# Patient Record
Sex: FEMALE
[De-identification: names, ages, dates, MRNs, and addresses within clinical notes are randomized per-mention and may not be internally consistent; named-entity substitution may affect disease eponyms.]

---

## 2017-05-08 ENCOUNTER — HOSPITAL ENCOUNTER (EMERGENCY)
Dept: HOSPITAL 14 - H.ER | Age: 27
Discharge: HOME | End: 2017-05-08
Payer: COMMERCIAL

## 2017-05-08 VITALS — SYSTOLIC BLOOD PRESSURE: 110 MMHG | OXYGEN SATURATION: 99 % | DIASTOLIC BLOOD PRESSURE: 68 MMHG

## 2017-05-08 VITALS — BODY MASS INDEX: 42 KG/M2

## 2017-05-08 VITALS — TEMPERATURE: 98.4 F | RESPIRATION RATE: 18 BRPM | HEART RATE: 88 BPM

## 2017-05-08 DIAGNOSIS — E11.9: ICD-10-CM

## 2017-05-08 DIAGNOSIS — I88.0: ICD-10-CM

## 2017-05-08 DIAGNOSIS — R19.7: ICD-10-CM

## 2017-05-08 DIAGNOSIS — Z79.84: ICD-10-CM

## 2017-05-08 DIAGNOSIS — R10.31: Primary | ICD-10-CM

## 2017-05-08 LAB
ALBUMIN/GLOB SERPL: 1.3 {RATIO} (ref 1–2.1)
ALP SERPL-CCNC: 66 U/L (ref 38–126)
ALT SERPL-CCNC: 30 U/L (ref 9–52)
AST SERPL-CCNC: 36 U/L (ref 14–36)
BASOPHILS # BLD AUTO: 0 K/UL (ref 0–0.2)
BASOPHILS NFR BLD: 0.4 % (ref 0–2)
BILIRUB SERPL-MCNC: 0.5 MG/DL (ref 0.2–1.3)
BILIRUB UR-MCNC: NEGATIVE MG/DL
BUN SERPL-MCNC: 14 MG/DL (ref 7–17)
CALCIUM SERPL-MCNC: 8.9 MG/DL (ref 8.4–10.2)
CHLORIDE SERPL-SCNC: 109 MMOL/L (ref 98–107)
CO2 SERPL-SCNC: 22 MMOL/L (ref 22–30)
COLOR UR: YELLOW
EOSINOPHIL # BLD AUTO: 0.1 K/UL (ref 0–0.7)
EOSINOPHIL NFR BLD: 1.5 % (ref 0–4)
ERYTHROCYTE [DISTWIDTH] IN BLOOD BY AUTOMATED COUNT: 16.1 % (ref 11.5–14.5)
GLOBULIN SER-MCNC: 3.2 GM/DL (ref 2.2–3.9)
GLUCOSE SERPL-MCNC: 86 MG/DL (ref 65–105)
GLUCOSE UR STRIP-MCNC: (no result) MG/DL
HCT VFR BLD CALC: 29.9 % (ref 34–47)
KETONES UR STRIP-MCNC: NEGATIVE MG/DL
LEUKOCYTE ESTERASE UR-ACNC: (no result) LEU/UL
LIPASE SERPL-CCNC: 88 U/L (ref 23–300)
LYMPHOCYTES # BLD AUTO: 2.3 K/UL (ref 1–4.3)
LYMPHOCYTES NFR BLD AUTO: 34.5 % (ref 20–40)
MCH RBC QN AUTO: 23.8 PG (ref 27–31)
MCHC RBC AUTO-ENTMCNC: 32.1 G/DL (ref 33–37)
MCV RBC AUTO: 74.3 FL (ref 81–99)
MONOCYTES # BLD: 0.4 K/UL (ref 0–0.8)
MONOCYTES NFR BLD: 5.9 % (ref 0–10)
NEUTROPHILS # BLD: 3.9 K/UL (ref 1.8–7)
NEUTROPHILS NFR BLD AUTO: 57.7 % (ref 50–75)
NRBC BLD AUTO-RTO: 0.2 % (ref 0–0)
PH UR STRIP: 6 [PH] (ref 5–8)
PLATELET # BLD: 312 K/UL (ref 130–400)
PMV BLD AUTO: 8.6 FL (ref 7.2–11.7)
POTASSIUM SERPL-SCNC: 4.2 MMOL/L (ref 3.6–5)
PROT SERPL-MCNC: 7.4 G/DL (ref 6.3–8.2)
PROT UR STRIP-MCNC: NEGATIVE MG/DL
RBC # UR STRIP: NEGATIVE /UL
RBC #/AREA URNS HPF: 2 /HPF (ref 0–3)
SODIUM SERPL-SCNC: 142 MMOL/L (ref 132–148)
SP GR UR STRIP: 1.03 (ref 1–1.03)
UROBILINOGEN UR-MCNC: (no result) MG/DL (ref 0.2–1)
WBC # BLD AUTO: 6.8 K/UL (ref 4.8–10.8)
WBC #/AREA URNS HPF: 1 /HPF (ref 0–5)

## 2017-05-08 NOTE — CT
PROCEDURE:  CT Abdomen and pelvis dated 05/08/2017 



HISTORY:

rloand-umbilical/RLQ pain



COMPARISON:

None.



TECHNIQUE:

Contrast dose: 90 cc Omnipaque 300 contrast material



Radiation dose:



Total exam DLP = 847.64 mGy-cm.



This CT exam was performed using one or more of the following dose 

reduction techniques: Automated exposure control, adjustment of the 

mA and/or kV according to patient size, and/or use of iterative 

reconstruction technique.



FINDINGS:



LOWER THORAX:

Some minimal linear atelectasis seen both posterior sulci.  No 

evidence of consolidation effusion or basilar pneumothorax. 



LIVER:

Liver exhibits normal size measuring nearly 16 cm in CC dimension. 

Mild diffuse fatty hepatic infiltration.  No obvious hepatic mass 

collection or calcification. 



GALLBLADDER AND BILE DUCTS:

Gallbladder is physiologically distended. No evidence of intraluminal 

gallbladder calculi. 



PANCREAS:

Unremarkable. No gross lesion or ductal dilatation.



SPLEEN:

Spleen exhibits normal size and attenuation pattern without mass 

collection or calcification. 



ADRENALS:

No adrenal lesions. 



KIDNEYS AND URETERS:

Kidneys exhibit symmetric nephrograms.  No evidence of 

nephrolithiasis or hydronephrosis. 



VASCULATURE:

No evidence of abdominal aortic aneurysm. 



BOWEL:

Evaluation of the bowel is limited due to incomplete opacification. 

Metallic clips on and suture along the stomach. Clinical correlation 

surgical history. Visualized loops of small bowel exhibit normal 

contour and caliber. No evidence of acute mechanical small bowel 

obstruction. Moderate amount stool seen throughout the cecum and at 

ascending as well as proximal transverse colon. The remaining colon 

is relatively collapsed. 



APPENDIX:

Normal-appearing CT appendix best seen on coronal image number [id number] 

[id number]. No evidence to suggest acute appendicitis There are multiple 

small to medium-sized mesenteric lymph nodes consistent with 

mesenteric adenitis 



PERITONEUM:

Unremarkable. No free fluid. No free air. 



LYMPH NODES:

Unremarkable. No enlarged lymph nodes. 



BLADDER:

Urinary bladder is physiologically distended.  No evidence of 

intraluminal urinary bladder calculi. 



REPRODUCTIVE:

Large left adnexal cyst measuring approximately 4.1 x 3.5 cm. Pelvic 

ultrasound could be performed confirm 



BONES:

Osseous structures intact. 



OTHER FINDINGS:

None.



IMPRESSION:

Findings consistent with mesenteric adenitis.  Postoperative changes 

of gastric bypass. No evidence of acute appendicitis. 



Large left adnexal cyst.



Mild fatty hepatic infiltration.

## 2017-05-08 NOTE — ED PDOC
HPI: General Adult


Time Seen by Provider: 17 13:49


Chief Complaint (Nursing): Abdominal Pain


Chief Complaint (Provider): abdominal pain


History Per: Patient


History/Exam Limitations: no limitations


Additional Complaint(s): 


25yo female complaining of mid abdominal pain radiating down. It has been 

intermittent for a week but now worsening. For past 3 days there is diarrhea 

associated with eating. No nausea or vomit. No fever, hematochezia.





Patient notes menstrual period is late. 





Past Medical History


Reviewed: Historical Data, Nursing Documentation, Vital Signs


Vital Signs: 


 Last Vital Signs











Temp  98.4 F   17 13:27


 


Pulse  88   17 13:27


 


Resp  18   17 13:27


 


BP  122/72   17 13:27


 


Pulse Ox  100   17 14:11














- Medical History


PMH: Diabetes, Hypercholesterolemia, Sleep Apnea (Has C PAP at home for sleep 

but does not use it)





- Surgical History


Surgical History: Endoscopy


Other surgeries: gastric bypass





- Family History


Family History: States: Unknown Family Hx





- Immunization History


Hx Influenza Vaccination: Yes





- Home Medications


Home Medications: 


 Ambulatory Orders











 Medication  Instructions  Recorded


 


Metformin Hydrochloride [Metformin 500 mg PO BID 14





HCl]  


 


Famotidine [Pepcid] 20 mg PO BID #30 tab 02/22/15














- Allergies


Allergies/Adverse Reactions: 


 Allergies











Allergy/AdvReac Type Severity Reaction Status Date / Time


 


No Known Allergies Allergy   Verified 02/22/15 17:34














Review of Systems


ROS Statement: Except As Marked, All Systems Reviewed And Found Negative


Constitutional: Negative for: Fever


Gastrointestinal: Positive for: Abdominal Pain, Diarrhea.  Negative for: Nausea

, Vomiting





Physical Exam





- Reviewed


Nursing Documentation Reviewed: Yes


Vital Signs Reviewed: Yes





- Physical Exam


Appears: Positive for: Well, Non-toxic, No Acute Distress


Head Exam: Positive for: ATRAUMATIC, NORMAL INSPECTION, NORMOCEPHALIC


Skin: Positive for: Warm, Dry


Eye Exam: Positive for: EOMI, PERRL


Cardiovascular/Chest: Positive for: Regular Rate, Rhythm


Respiratory: Positive for: Normal Breath Sounds.  Negative for: Rales, Rhonchi, 

Wheezing


Gastrointestinal/Abdominal: Positive for: Soft, Tenderness (periumbilical, 

right lower quadrant, left lower quadrant).  Negative for: Other (Psoa's)





- ECG


O2 Sat by Pulse Oximetry: 100 (RA)


Pulse Ox Interpretation: Normal





Medical Decision Making


Medical Decision Makin negative for pregnancy





Additional Comments





- Additional Comments


Additional Comments: 





Scribe Attestation:


Documented by Vickey Caicedo acting as a scribe for Olivia Steele PA-C.


Provider Scribe Attestation:


All medical record entries made by the Scribe were at my direction and 

personally dictated by me. I


have reviewed the chart and agree that the record accurately reflects my 

personal performance of the


history, physical exam, medical decision making, and the department course for 

this patient. I have


also personally directed, reviewed, and agree with the discharge instructions 

and disposition.

## 2019-04-03 ENCOUNTER — HOSPITAL ENCOUNTER (EMERGENCY)
Dept: HOSPITAL 31 - C.ER | Age: 29
LOS: 1 days | Discharge: HOME | End: 2019-04-04
Payer: COMMERCIAL

## 2019-04-03 VITALS — BODY MASS INDEX: 42 KG/M2

## 2019-04-03 DIAGNOSIS — E78.00: ICD-10-CM

## 2019-04-03 DIAGNOSIS — E11.9: ICD-10-CM

## 2019-04-03 DIAGNOSIS — Z3A.00: ICD-10-CM

## 2019-04-03 DIAGNOSIS — G47.30: ICD-10-CM

## 2019-04-03 DIAGNOSIS — J45.909: ICD-10-CM

## 2019-04-03 DIAGNOSIS — O26.891: Primary | ICD-10-CM

## 2019-04-03 LAB
ALBUMIN SERPL-MCNC: 4.1 G/DL (ref 3.5–5)
ALBUMIN/GLOB SERPL: 1.4 {RATIO} (ref 1–2.1)
ALT SERPL-CCNC: 15 U/L (ref 9–52)
APTT BLD: 26 SECONDS (ref 21–34)
ARTERIAL BLOOD GAS O2 SAT: 97.4 % (ref 95–98)
ARTERIAL BLOOD GAS PCO2: 30 MM/HG (ref 35–45)
ARTERIAL BLOOD GAS TCO2: 21.3 MMOL/L (ref 22–28)
ARTERIAL PATENCY WRIST A: (no result)
AST SERPL-CCNC: 31 U/L (ref 14–36)
BACTERIA #/AREA URNS HPF: (no result) /[HPF]
BASOPHILS # BLD AUTO: 0 K/UL (ref 0–0.2)
BASOPHILS NFR BLD: 0.4 % (ref 0–2)
BILIRUB UR-MCNC: NEGATIVE MG/DL
BNP SERPL-MCNC: 76.6 PG/ML (ref 0–450)
BUN SERPL-MCNC: 8 MG/DL (ref 7–17)
CALCIUM SERPL-MCNC: 8.6 MG/DL (ref 8.6–10.4)
EOSINOPHIL # BLD AUTO: 0 K/UL (ref 0–0.7)
EOSINOPHIL NFR BLD: 0.5 % (ref 0–4)
ERYTHROCYTE [DISTWIDTH] IN BLOOD BY AUTOMATED COUNT: 19 % (ref 11.5–14.5)
GFR NON-AFRICAN AMERICAN: > 60
GLUCOSE UR STRIP-MCNC: NORMAL MG/DL
HCG,QUALITATIVE URINE: POSITIVE
HCO3 BLDA-SCNC: 22.8 MMOL/L (ref 21–28)
HGB BLD-MCNC: 8.8 G/DL (ref 11–16)
INHALED O2 CONCENTRATION: 21 %
INR PPP: 1
LEUKOCYTE ESTERASE UR-ACNC: (no result) LEU/UL
LYMPHOCYTES # BLD AUTO: 2.2 K/UL (ref 1–4.3)
LYMPHOCYTES NFR BLD AUTO: 30.1 % (ref 20–40)
MCH RBC QN AUTO: 21 PG (ref 27–31)
MCHC RBC AUTO-ENTMCNC: 30.2 G/DL (ref 33–37)
MCV RBC AUTO: 69.6 FL (ref 81–99)
MONOCYTES # BLD: 0.6 K/UL (ref 0–0.8)
MONOCYTES NFR BLD: 7.8 % (ref 0–10)
NEUTROPHILS # BLD: 4.5 K/UL (ref 1.8–7)
NEUTROPHILS NFR BLD AUTO: 61.2 % (ref 50–75)
NRBC BLD AUTO-RTO: 0 % (ref 0–2)
PH BLDA: 7.44 [PH] (ref 7.35–7.45)
PH UR STRIP: 5 [PH] (ref 5–8)
PLATELET # BLD: 368 K/UL (ref 130–400)
PMV BLD AUTO: 8.4 FL (ref 7.2–11.7)
PO2 BLDA: 92 MM/HG (ref 80–100)
PROT UR STRIP-MCNC: NEGATIVE MG/DL
PROTHROMBIN TIME: 11.1 SECONDS (ref 9.7–12.2)
RBC # BLD AUTO: 4.2 MIL/UL (ref 3.8–5.2)
RBC # UR STRIP: (no result) /UL
SP GR UR STRIP: 1.01 (ref 1–1.03)
SQUAMOUS EPITHIAL: 4 /HPF (ref 0–5)
UROBILINOGEN UR-MCNC: NORMAL MG/DL (ref 0.2–1)
WBC # BLD AUTO: 7.3 K/UL (ref 4.8–10.8)

## 2019-04-03 PROCEDURE — 99284 EMERGENCY DEPT VISIT MOD MDM: CPT

## 2019-04-03 PROCEDURE — 82803 BLOOD GASES ANY COMBINATION: CPT

## 2019-04-03 PROCEDURE — 85610 PROTHROMBIN TIME: CPT

## 2019-04-03 PROCEDURE — 85730 THROMBOPLASTIN TIME PARTIAL: CPT

## 2019-04-03 PROCEDURE — 85025 COMPLETE CBC W/AUTO DIFF WBC: CPT

## 2019-04-03 PROCEDURE — 84702 CHORIONIC GONADOTROPIN TEST: CPT

## 2019-04-03 PROCEDURE — 81001 URINALYSIS AUTO W/SCOPE: CPT

## 2019-04-03 PROCEDURE — 93005 ELECTROCARDIOGRAM TRACING: CPT

## 2019-04-03 PROCEDURE — 85378 FIBRIN DEGRADE SEMIQUANT: CPT

## 2019-04-03 PROCEDURE — 80053 COMPREHEN METABOLIC PANEL: CPT

## 2019-04-03 PROCEDURE — 84703 CHORIONIC GONADOTROPIN ASSAY: CPT

## 2019-04-03 PROCEDURE — 76856 US EXAM PELVIC COMPLETE: CPT

## 2019-04-03 PROCEDURE — 83880 ASSAY OF NATRIURETIC PEPTIDE: CPT

## 2019-04-03 PROCEDURE — 84484 ASSAY OF TROPONIN QUANT: CPT

## 2019-04-03 PROCEDURE — 76830 TRANSVAGINAL US NON-OB: CPT

## 2019-04-03 RX ADMIN — IPRATROPIUM BROMIDE AND ALBUTEROL SULFATE SCH ML: .5; 3 SOLUTION RESPIRATORY (INHALATION) at 20:57

## 2019-04-03 NOTE — C.PDOC
History Of Present Illness


The patient presents to the ED for evaluation of shortness of breath and chest 

discomfort that have been intermittent for around one month. Patient was given 

an Albuterol inhaler, which she has used twice without relief. Patient is s

peaking in full sentences in the ED at this time and denies fever, chills, 

nausea, vomiting, recent prolonged trips, or known trauma. 


Time Seen by Provider: 04/03/19 19:59


Chief Complaint (Nursing): Chest Pain


History Per: Patient


History/Exam Limitations: no limitations


Onset/Duration Of Symptoms: Intermittent Episodes, Other (one month )


Current Symptoms Are (Timing): Still Present


Severity: Mild


Pain Scale Rating Of: 2


Quality: "Pain"


Modifying Factors: None


Exacerbating Factors: None


Alleviating Factors: None


Recent travel outside of the [place]: No


Additional History Per: Patient





Past Medical History


Reviewed: Historical Data, Nursing Documentation, Vital Signs


Vital Signs: 





                                Last Vital Signs











Temp  97.9 F   04/03/19 19:42


 


Pulse  99 H  04/03/19 19:42


 


Resp  22   04/03/19 19:42


 


BP  130/87   04/03/19 19:42


 


Pulse Ox  100   04/03/19 19:42














- Medical History


PMH: Asthma, Diabetes, Hypercholesterolemia, Sleep Apnea (Has C PAP at home for 

sleep but does not use it)


   Denies: Chronic Kidney Disease


Surgical History: Endoscopy





- [facility] Procedures











ESOPHAGOGASTRODUODENOSCOPY [EGD] W/CLOSED BIOPSY (11/13/14)


HIGHLY SELECT VAGOTOMY (02/16/15)


LAPAROSCOPIC GASTROENTEROSTOMY (02/16/15)


OTHER GASTROSCOPY (02/16/15)


VACCINATION NEC (02/16/15)








Family History: States: Unknown Family Hx





- Social History


Hx Tobacco Use: No


Hx Alcohol Use: Yes


Hx Substance Use: No





- Immunization History


Hx Influenza Vaccination: Yes





Review Of Systems


Constitutional: Negative for: Fever, Chills


Cardiovascular: Positive for: Other (chest discomfort )


Respiratory: Positive for: Shortness of Breath





Physical Exam





- Physical Exam


Appears: Non-toxic, No Acute Distress


Skin: Warm, Dry


Head: Normacephalic


Eye(s): bilateral: Normal Inspection


Oral Mucosa: Moist


Neck: Supple


Chest: Symmetrical, No Deformity, No Tenderness


Cardiovascular: Rhythm Regular, No Murmur


Respiratory: No Rales, No Rhonchi, No Wheezing, Other (speaking in full 

sentences )


Gastrointestinal/Abdominal: Soft, No Tenderness, No Guarding, No Rebound


Extremity: Normal ROM, Capillary Refill (less than 2 seconds)


Neurological/Psych: Oriented x3





ED Course And Treatment





- Laboratory Results


Result Diagrams: 


                                 04/03/19 20:23





                                 04/03/19 20:23


ECG: Interpreted By Me, Viewed By Me


ECG Rhythm: Sinus Rhythm (99), Nonspecific Changes


O2 Sat by Pulse Oximetry: 100


Pulse Ox Interpretation: Normal


Progress Note: Bloodwork, urinalysis, EKG ordered and reviewed.  Albuterol INH 

and IV Fluids given.


Reevaluation Time: 00:20


Reassessment Condition: Improved





Medical Decision Making


Medical Decision Making: 





Upon provider reevaluation patient is feeling better, is medically stable, and 

requires no further treatment in the ED at this time. Patient will be discharged

 home with Rx for albuterol  . Counseling was provided and all questions were 

answered regarding diagnosis and need for follow up with the referred clinic. T

here is agreement to discharge plan. Return if symptoms persist or worsen.





Disposition


Counseled Patient/Family Regarding: Studies Performed, Diagnosis, Need For 

Followup





- Disposition


Referrals: 


Varghese Stone MD [Medical Doctor] - 


Disposition: HOME/ ROUTINE


Disposition Time: 00:23


Condition: FAIR


Additional Instructions: 


Please follow up with your ob/gyn and have a repeat BHCG in 7 days


Prescriptions: 


Albuterol HFA [Ventolin HFA 90 mcg/actuation (8 g)] 2 puff IH [id number] PRN #1 

puff


 PRN Reason: Wheezing


Instructions:  Pregnancy - The First Month, Asthma, Adult (DC)


Forms:  [facility] (English)





- Clinical Impression


Clinical Impression: 


 Reactive airway disease, Pregnancy








- Scribe Statement


The provider has reviewed the documentation as recorded by the Scribe (Sarah Beth Kulkarni)


Provider Attestation: 








All medical record entries made by the Scribe were at my direction and 

personally dictated by me. I have reviewed the chart and agree that the record 

accurately reflects my personal performance of the history, physical exam, 

medical decision making, and the department course for this patient. I have also

personally directed, reviewed, and agree with the discharge instructions and 

disposition.

## 2019-04-04 VITALS
HEART RATE: 74 BPM | SYSTOLIC BLOOD PRESSURE: 137 MMHG | TEMPERATURE: 98.6 F | DIASTOLIC BLOOD PRESSURE: 89 MMHG | RESPIRATION RATE: 17 BRPM | OXYGEN SATURATION: 99 %

## 2019-04-04 NOTE — US
Date of service: 



04/03/2019



PROCEDURE:  



HISTORY:

abd pain hcg 1300.  Patient is unsure of the LMP. 



COMPARISON:

None



TECHNIQUE:

Transabdominal and transvaginal TECHNIQUE: Real-time 2D imaging, 

duplex and color Doppler.



FINDINGS:

The uterus is anteverted measures 9.5 x 5.3 x 6.0 cm.



The endometrium appears thickened at the fundus and measures 1.5 cm.  

There is a tiny 2.3 x 1.8 by 3.1 mm anechoic focus in the thickened 

endometrial portion of the central uterine cavity which may represent 

a very tiny gestational sac is too small to accurately characterize 

or date.  This does not exclude an ectopic.



Right ovary measures 3.8 x 2.4 x 3.7 cm.  There is normal Doppler 

flow here seen.  A right corpus luteal cyst is suggested measuring 

2.4 x 2.0 x 2.4 cm.



The left ovary measures 3.6 x 2.2 x 3.2 cm.  Here there is also 

normal Doppler flow appreciated.  A a left follicular ovarian cysts 

is suggested measuring 1.6 x 1.7 x 1.6 cm. 



No free fluid in the cul-de-sac seen. 



IMPRESSION:

No definitive intrauterine gestation identified.  The tiny 

approximately 2 to 3 mm anechoic focus within the uterine cavity 

thickened endometrial sec shin may represent a very early less than 5 

week gestation.  The pseudo gestational sac of an ectopic is not 

excluded.  Correlation with serial beta HCG levels is recommended.  

Findings compatible with a right corpus luteal cyst. 



Concordant results (preliminary interpretation) provided by kirby.

## 2019-04-07 NOTE — CARD
--------------- APPROVED REPORT --------------





Date of service: 04/03/2019



EKG Measurement

Heart [id number]

ID [id number]

[id number]

[id number]

[id number]



<Conclusion>

Normal sinus rhythm

Normal ECG